# Patient Record
Sex: MALE | Race: BLACK OR AFRICAN AMERICAN | Employment: UNEMPLOYED
[De-identification: names, ages, dates, MRNs, and addresses within clinical notes are randomized per-mention and may not be internally consistent; named-entity substitution may affect disease eponyms.]

---

## 2022-03-18 PROBLEM — K92.2 GI BLEED: Status: ACTIVE | Noted: 2022-02-03

## 2022-03-18 PROBLEM — I63.9 STROKE (HCC): Status: ACTIVE | Noted: 2022-01-02

## 2022-03-18 PROBLEM — I63.9 ACUTE CVA (CEREBROVASCULAR ACCIDENT) (HCC): Status: ACTIVE | Noted: 2022-01-02

## 2022-03-19 PROBLEM — K52.9 SIGMOIDITIS: Status: ACTIVE | Noted: 2022-02-03

## 2022-03-19 PROBLEM — R29.898 LOWER EXTREMITY WEAKNESS: Status: ACTIVE | Noted: 2022-01-02

## 2022-05-19 PROBLEM — R55 SYNCOPE: Status: ACTIVE | Noted: 2022-05-19

## 2022-06-22 PROBLEM — I63.9 CVA (CEREBRAL VASCULAR ACCIDENT) (HCC): Status: ACTIVE | Noted: 2022-06-22

## 2022-06-23 PROBLEM — R50.9 FEVER: Status: ACTIVE | Noted: 2022-06-23

## 2022-06-23 PROBLEM — R29.810 FACIAL DROOP: Status: ACTIVE | Noted: 2022-06-23

## 2023-02-01 RX ORDER — CLOPIDOGREL BISULFATE 75 MG/1
75 TABLET ORAL DAILY
Status: ON HOLD | COMMUNITY
Start: 2022-01-11 | End: 2023-04-03 | Stop reason: HOSPADM

## 2023-02-01 RX ORDER — ATORVASTATIN CALCIUM 80 MG/1
80 TABLET, FILM COATED ORAL
COMMUNITY
Start: 2022-01-10

## 2023-02-01 RX ORDER — PANTOPRAZOLE SODIUM 40 MG/1
40 TABLET, DELAYED RELEASE ORAL
COMMUNITY
Start: 2022-02-09 | End: 2023-04-01 | Stop reason: CLARIF

## 2023-02-01 RX ORDER — ASPIRIN 81 MG/1
81 TABLET, CHEWABLE ORAL DAILY
COMMUNITY
Start: 2022-01-11

## 2023-02-01 RX ORDER — AMLODIPINE BESYLATE 10 MG/1
10 TABLET ORAL DAILY
Status: ON HOLD | COMMUNITY
Start: 2022-01-11 | End: 2023-04-03 | Stop reason: HOSPADM

## 2023-04-01 ENCOUNTER — APPOINTMENT (OUTPATIENT)
Facility: HOSPITAL | Age: 68
DRG: 379 | End: 2023-04-01
Payer: MEDICARE

## 2023-04-01 ENCOUNTER — HOSPITAL ENCOUNTER (INPATIENT)
Facility: HOSPITAL | Age: 68
LOS: 2 days | Discharge: NURSING, REHAB OR LONG-TERM CARE | DRG: 379 | End: 2023-04-03
Attending: EMERGENCY MEDICINE | Admitting: INTERNAL MEDICINE
Payer: MEDICARE

## 2023-04-01 DIAGNOSIS — R55 NEAR SYNCOPE: ICD-10-CM

## 2023-04-01 DIAGNOSIS — R55 SYNCOPE AND COLLAPSE: ICD-10-CM

## 2023-04-01 DIAGNOSIS — D50.0 BLOOD LOSS ANEMIA: Primary | ICD-10-CM

## 2023-04-01 DIAGNOSIS — K92.2 GASTROINTESTINAL HEMORRHAGE, UNSPECIFIED GASTROINTESTINAL HEMORRHAGE TYPE: ICD-10-CM

## 2023-04-01 PROBLEM — I10 ESSENTIAL HYPERTENSION, BENIGN: Status: ACTIVE | Noted: 2022-04-25

## 2023-04-01 PROBLEM — Z86.73 HISTORY OF CVA (CEREBROVASCULAR ACCIDENT): Status: ACTIVE | Noted: 2022-01-02

## 2023-04-01 PROBLEM — D64.9 SYMPTOMATIC ANEMIA: Status: ACTIVE | Noted: 2023-04-01

## 2023-04-01 LAB
ALBUMIN SERPL-MCNC: 3.2 G/DL (ref 3.4–5)
ALBUMIN/GLOB SERPL: 0.9 (ref 0.8–1.7)
ALP SERPL-CCNC: 85 U/L (ref 45–117)
ALT SERPL-CCNC: 22 U/L (ref 16–61)
ANION GAP SERPL CALC-SCNC: 7 MMOL/L (ref 3–18)
APPEARANCE UR: CLEAR
AST SERPL-CCNC: 17 U/L (ref 10–38)
BACTERIA URNS QL MICRO: NEGATIVE /HPF
BASOPHILS # BLD: 0.1 K/UL (ref 0–0.1)
BASOPHILS # BLD: 0.1 K/UL (ref 0–0.1)
BASOPHILS NFR BLD: 1 % (ref 0–2)
BASOPHILS NFR BLD: 1 % (ref 0–2)
BILIRUB SERPL-MCNC: 0.2 MG/DL (ref 0.2–1)
BILIRUB UR QL: NEGATIVE
BUN SERPL-MCNC: 12 MG/DL (ref 7–18)
BUN/CREAT SERPL: 10 (ref 12–20)
CALCIUM SERPL-MCNC: 8.8 MG/DL (ref 8.5–10.1)
CHLORIDE SERPL-SCNC: 107 MMOL/L (ref 100–111)
CO2 SERPL-SCNC: 27 MMOL/L (ref 21–32)
COLOR UR: ABNORMAL
CREAT SERPL-MCNC: 1.25 MG/DL (ref 0.6–1.3)
DIFFERENTIAL METHOD BLD: ABNORMAL
DIFFERENTIAL METHOD BLD: ABNORMAL
EKG ATRIAL RATE: 66 BPM
EKG DIAGNOSIS: NORMAL
EKG P AXIS: 69 DEGREES
EKG P-R INTERVAL: 146 MS
EKG Q-T INTERVAL: 434 MS
EKG QRS DURATION: 76 MS
EKG QTC CALCULATION (BAZETT): 454 MS
EKG R AXIS: 51 DEGREES
EKG T AXIS: 54 DEGREES
EKG VENTRICULAR RATE: 66 BPM
EOSINOPHIL # BLD: 0 K/UL (ref 0–0.4)
EOSINOPHIL # BLD: 0.1 K/UL (ref 0–0.4)
EOSINOPHIL NFR BLD: 0 % (ref 0–5)
EOSINOPHIL NFR BLD: 1 % (ref 0–5)
EPITH CASTS URNS QL MICRO: NORMAL /LPF (ref 0–5)
ERYTHROCYTE [DISTWIDTH] IN BLOOD BY AUTOMATED COUNT: 15.2 % (ref 11.6–14.5)
ERYTHROCYTE [DISTWIDTH] IN BLOOD BY AUTOMATED COUNT: 15.2 % (ref 11.6–14.5)
FERRITIN SERPL-MCNC: 26 NG/ML (ref 8–388)
FOLATE SERPL-MCNC: 19.9 NG/ML (ref 3.1–17.5)
GLOBULIN SER CALC-MCNC: 3.6 G/DL (ref 2–4)
GLUCOSE SERPL-MCNC: 153 MG/DL (ref 74–99)
GLUCOSE UR STRIP.AUTO-MCNC: NEGATIVE MG/DL
GRAN CASTS URNS QL MICRO: NORMAL /LPF
HCT VFR BLD AUTO: 25.4 % (ref 36–48)
HCT VFR BLD AUTO: 25.4 % (ref 36–48)
HEMOCCULT STL QL: POSITIVE
HGB BLD-MCNC: 7.7 G/DL (ref 13–16)
HGB BLD-MCNC: 7.7 G/DL (ref 13–16)
HGB UR QL STRIP: NEGATIVE
IMM GRANULOCYTES # BLD AUTO: 0 K/UL (ref 0–0.04)
IMM GRANULOCYTES # BLD AUTO: 0 K/UL (ref 0–0.04)
IMM GRANULOCYTES NFR BLD AUTO: 0 % (ref 0–0.5)
IMM GRANULOCYTES NFR BLD AUTO: 0 % (ref 0–0.5)
INR PPP: 1 (ref 0.8–1.2)
IRON SATN MFR SERPL: 12 % (ref 20–50)
IRON SERPL-MCNC: 34 UG/DL (ref 50–175)
KETONES UR QL STRIP.AUTO: ABNORMAL MG/DL
LEUKOCYTE ESTERASE UR QL STRIP.AUTO: NEGATIVE
LYMPHOCYTES # BLD: 1.3 K/UL (ref 0.9–3.6)
LYMPHOCYTES # BLD: 1.5 K/UL (ref 0.9–3.6)
LYMPHOCYTES NFR BLD: 13 % (ref 21–52)
LYMPHOCYTES NFR BLD: 14 % (ref 21–52)
MAGNESIUM SERPL-MCNC: 2.2 MG/DL (ref 1.6–2.6)
MCH RBC QN AUTO: 26.9 PG (ref 24–34)
MCH RBC QN AUTO: 27.2 PG (ref 24–34)
MCHC RBC AUTO-ENTMCNC: 30.3 G/DL (ref 31–37)
MCHC RBC AUTO-ENTMCNC: 30.3 G/DL (ref 31–37)
MCV RBC AUTO: 88.8 FL (ref 78–100)
MCV RBC AUTO: 89.8 FL (ref 78–100)
MONOCYTES # BLD: 0.6 K/UL (ref 0.05–1.2)
MONOCYTES # BLD: 0.6 K/UL (ref 0.05–1.2)
MONOCYTES NFR BLD: 5 % (ref 3–10)
MONOCYTES NFR BLD: 6 % (ref 3–10)
NEUTS SEG # BLD: 7.3 K/UL (ref 1.8–8)
NEUTS SEG # BLD: 8.9 K/UL (ref 1.8–8)
NEUTS SEG NFR BLD: 78 % (ref 40–73)
NEUTS SEG NFR BLD: 80 % (ref 40–73)
NITRITE UR QL STRIP.AUTO: NEGATIVE
NRBC # BLD: 0 K/UL (ref 0–0.01)
NRBC # BLD: 0.02 K/UL (ref 0–0.01)
NRBC BLD-RTO: 0 PER 100 WBC
NRBC BLD-RTO: 0.2 PER 100 WBC
PH UR STRIP: 6 (ref 5–8)
PLATELET # BLD AUTO: 295 K/UL (ref 135–420)
PLATELET # BLD AUTO: 296 K/UL (ref 135–420)
PMV BLD AUTO: 11.2 FL (ref 9.2–11.8)
PMV BLD AUTO: 11.6 FL (ref 9.2–11.8)
POTASSIUM SERPL-SCNC: 3.9 MMOL/L (ref 3.5–5.5)
PROT SERPL-MCNC: 6.8 G/DL (ref 6.4–8.2)
PROT UR STRIP-MCNC: 30 MG/DL
PROTHROMBIN TIME: 13.6 SEC (ref 11.5–15.2)
RBC # BLD AUTO: 2.83 M/UL (ref 4.35–5.65)
RBC # BLD AUTO: 2.86 M/UL (ref 4.35–5.65)
RBC #/AREA URNS HPF: NEGATIVE /HPF (ref 0–5)
SODIUM SERPL-SCNC: 141 MMOL/L (ref 136–145)
SP GR UR REFRACTOMETRY: 1.02 (ref 1–1.03)
TIBC SERPL-MCNC: 276 UG/DL (ref 250–450)
TROPONIN I SERPL HS-MCNC: 6 NG/L (ref 0–78)
UROBILINOGEN UR QL STRIP.AUTO: 1 EU/DL (ref 0.2–1)
VIT B12 SERPL-MCNC: 553 PG/ML (ref 211–911)
WBC # BLD AUTO: 11 K/UL (ref 4.6–13.2)
WBC # BLD AUTO: 9.4 K/UL (ref 4.6–13.2)
WBC URNS QL MICRO: NORMAL /HPF (ref 0–4)

## 2023-04-01 PROCEDURE — 93005 ELECTROCARDIOGRAM TRACING: CPT | Performed by: STUDENT IN AN ORGANIZED HEALTH CARE EDUCATION/TRAINING PROGRAM

## 2023-04-01 PROCEDURE — C9113 INJ PANTOPRAZOLE SODIUM, VIA: HCPCS | Performed by: STUDENT IN AN ORGANIZED HEALTH CARE EDUCATION/TRAINING PROGRAM

## 2023-04-01 PROCEDURE — A4216 STERILE WATER/SALINE, 10 ML: HCPCS | Performed by: STUDENT IN AN ORGANIZED HEALTH CARE EDUCATION/TRAINING PROGRAM

## 2023-04-01 PROCEDURE — 6370000000 HC RX 637 (ALT 250 FOR IP): Performed by: PHYSICIAN ASSISTANT

## 2023-04-01 PROCEDURE — 83735 ASSAY OF MAGNESIUM: CPT

## 2023-04-01 PROCEDURE — 2580000003 HC RX 258: Performed by: PHYSICIAN ASSISTANT

## 2023-04-01 PROCEDURE — 85610 PROTHROMBIN TIME: CPT

## 2023-04-01 PROCEDURE — 85025 COMPLETE CBC W/AUTO DIFF WBC: CPT

## 2023-04-01 PROCEDURE — 71045 X-RAY EXAM CHEST 1 VIEW: CPT

## 2023-04-01 PROCEDURE — 70450 CT HEAD/BRAIN W/O DYE: CPT

## 2023-04-01 PROCEDURE — 81001 URINALYSIS AUTO W/SCOPE: CPT

## 2023-04-01 PROCEDURE — 99285 EMERGENCY DEPT VISIT HI MDM: CPT

## 2023-04-01 PROCEDURE — 82272 OCCULT BLD FECES 1-3 TESTS: CPT

## 2023-04-01 PROCEDURE — 84484 ASSAY OF TROPONIN QUANT: CPT

## 2023-04-01 PROCEDURE — 99223 1ST HOSP IP/OBS HIGH 75: CPT | Performed by: PHYSICIAN ASSISTANT

## 2023-04-01 PROCEDURE — 82746 ASSAY OF FOLIC ACID SERUM: CPT

## 2023-04-01 PROCEDURE — 82728 ASSAY OF FERRITIN: CPT

## 2023-04-01 PROCEDURE — 2580000003 HC RX 258: Performed by: STUDENT IN AN ORGANIZED HEALTH CARE EDUCATION/TRAINING PROGRAM

## 2023-04-01 PROCEDURE — 94761 N-INVAS EAR/PLS OXIMETRY MLT: CPT

## 2023-04-01 PROCEDURE — 6360000002 HC RX W HCPCS: Performed by: PHYSICIAN ASSISTANT

## 2023-04-01 PROCEDURE — 93010 ELECTROCARDIOGRAM REPORT: CPT | Performed by: INTERNAL MEDICINE

## 2023-04-01 PROCEDURE — 96374 THER/PROPH/DIAG INJ IV PUSH: CPT

## 2023-04-01 PROCEDURE — 83540 ASSAY OF IRON: CPT

## 2023-04-01 PROCEDURE — A4216 STERILE WATER/SALINE, 10 ML: HCPCS | Performed by: PHYSICIAN ASSISTANT

## 2023-04-01 PROCEDURE — 87086 URINE CULTURE/COLONY COUNT: CPT

## 2023-04-01 PROCEDURE — 6360000002 HC RX W HCPCS: Performed by: STUDENT IN AN ORGANIZED HEALTH CARE EDUCATION/TRAINING PROGRAM

## 2023-04-01 PROCEDURE — 73030 X-RAY EXAM OF SHOULDER: CPT

## 2023-04-01 PROCEDURE — 80053 COMPREHEN METABOLIC PANEL: CPT

## 2023-04-01 PROCEDURE — C9113 INJ PANTOPRAZOLE SODIUM, VIA: HCPCS | Performed by: PHYSICIAN ASSISTANT

## 2023-04-01 PROCEDURE — 1100000003 HC PRIVATE W/ TELEMETRY

## 2023-04-01 RX ORDER — POLYETHYLENE GLYCOL 3350 17 G/17G
17 POWDER, FOR SOLUTION ORAL DAILY PRN
Status: DISCONTINUED | OUTPATIENT
Start: 2023-04-01 | End: 2023-04-03 | Stop reason: HOSPADM

## 2023-04-01 RX ORDER — ACETAMINOPHEN 325 MG/1
650 TABLET ORAL EVERY 6 HOURS PRN
Status: DISCONTINUED | OUTPATIENT
Start: 2023-04-01 | End: 2023-04-03 | Stop reason: HOSPADM

## 2023-04-01 RX ORDER — SODIUM CHLORIDE 0.9 % (FLUSH) 0.9 %
5-40 SYRINGE (ML) INJECTION EVERY 12 HOURS SCHEDULED
Status: DISCONTINUED | OUTPATIENT
Start: 2023-04-01 | End: 2023-04-03 | Stop reason: HOSPADM

## 2023-04-01 RX ORDER — ONDANSETRON 4 MG/1
4 TABLET, ORALLY DISINTEGRATING ORAL EVERY 8 HOURS PRN
Status: DISCONTINUED | OUTPATIENT
Start: 2023-04-01 | End: 2023-04-03 | Stop reason: HOSPADM

## 2023-04-01 RX ORDER — PANTOPRAZOLE SODIUM 20 MG/1
20 TABLET, DELAYED RELEASE ORAL DAILY
Status: ON HOLD | COMMUNITY
Start: 2023-03-22 | End: 2023-04-03 | Stop reason: HOSPADM

## 2023-04-01 RX ORDER — ACETAMINOPHEN 650 MG/1
650 SUPPOSITORY RECTAL EVERY 6 HOURS PRN
Status: DISCONTINUED | OUTPATIENT
Start: 2023-04-01 | End: 2023-04-03 | Stop reason: HOSPADM

## 2023-04-01 RX ORDER — ONDANSETRON 2 MG/ML
4 INJECTION INTRAMUSCULAR; INTRAVENOUS EVERY 6 HOURS PRN
Status: DISCONTINUED | OUTPATIENT
Start: 2023-04-01 | End: 2023-04-03 | Stop reason: HOSPADM

## 2023-04-01 RX ORDER — BISACODYL 10 MG
10 SUPPOSITORY, RECTAL RECTAL DAILY PRN
Status: DISCONTINUED | OUTPATIENT
Start: 2023-04-01 | End: 2023-04-03 | Stop reason: HOSPADM

## 2023-04-01 RX ORDER — SODIUM CHLORIDE 9 MG/ML
INJECTION, SOLUTION INTRAVENOUS PRN
Status: DISCONTINUED | OUTPATIENT
Start: 2023-04-01 | End: 2023-04-03 | Stop reason: HOSPADM

## 2023-04-01 RX ORDER — ATORVASTATIN CALCIUM 40 MG/1
80 TABLET, FILM COATED ORAL NIGHTLY
Status: DISCONTINUED | OUTPATIENT
Start: 2023-04-01 | End: 2023-04-03 | Stop reason: HOSPADM

## 2023-04-01 RX ORDER — SODIUM CHLORIDE 0.9 % (FLUSH) 0.9 %
5-40 SYRINGE (ML) INJECTION PRN
Status: DISCONTINUED | OUTPATIENT
Start: 2023-04-01 | End: 2023-04-03 | Stop reason: HOSPADM

## 2023-04-01 RX ADMIN — SODIUM CHLORIDE, PRESERVATIVE FREE 10 ML: 5 INJECTION INTRAVENOUS at 21:35

## 2023-04-01 RX ADMIN — SODIUM CHLORIDE 40 MG: 9 INJECTION INTRAMUSCULAR; INTRAVENOUS; SUBCUTANEOUS at 15:13

## 2023-04-01 RX ADMIN — ATORVASTATIN CALCIUM 80 MG: 40 TABLET, FILM COATED ORAL at 21:35

## 2023-04-01 RX ADMIN — SODIUM CHLORIDE 40 MG: 9 INJECTION INTRAMUSCULAR; INTRAVENOUS; SUBCUTANEOUS at 21:35

## 2023-04-01 ASSESSMENT — PAIN - FUNCTIONAL ASSESSMENT: PAIN_FUNCTIONAL_ASSESSMENT: NONE - DENIES PAIN

## 2023-04-01 ASSESSMENT — ENCOUNTER SYMPTOMS
CHEST TIGHTNESS: 0
TROUBLE SWALLOWING: 0
DIARRHEA: 0
COUGH: 0
SINUS PRESSURE: 0
ABDOMINAL PAIN: 0
PHOTOPHOBIA: 0
NAUSEA: 0
SHORTNESS OF BREATH: 0
VOMITING: 0
SORE THROAT: 0

## 2023-04-01 NOTE — CONSULTS
limits for recent and remote events. mood and affect: no evidence of depression, anxiety or agitation. orientation: oriented to time, space and person. Basic Metabolic Profile   Recent Labs     04/01/23  1230      K 3.9      CO2 27   BUN 12   MG 2.2         CBC w/Diff    Recent Labs     04/01/23  1745   WBC 11.0   RBC 2.86*   HGB 7.7*   HCT 25.4*   MCV 88.8   MCH 26.9   MCHC 30.3*   RDW 15.2*      MPV 11.6    No results for input(s): MONO, BASO, PRO, METAS, BLAST in the last 72 hours. Invalid input(s): GRANS, LYMPH, EOS, MYELO     Hepatic Function   No results for input(s): ALB, TP, AML in the last 72 hours.     Invalid input(s): DBILI, TBILI, GPT, SGOT, AP, LPSE     Coags   Recent Labs     04/01/23  1230   INR 1.0           Breanna Smith MD.   [facility]  [phone]

## 2023-04-01 NOTE — ED NOTES
Pt requesting something to eat, per MD Pt can eat at this time.      Jac Howard, TRICIA  04/01/23 7229

## 2023-04-01 NOTE — ED NOTES
Report called to [facility] for Pt. All questions answered.       Lizz Salinas RN  04/01/23 5181

## 2023-04-01 NOTE — ED TRIAGE NOTES
Pt arrived via EMS from [facility] presenting to ER for dizziness s/p fall, per report pt was walking towards his son's car when he suddenly felt weak, dizzy and fell down on the ground, denies hitting head, denies LOC. Hx CVA with right side deficit 2021 pt taking ASA and Plavix, on Ed arrival pt is alert and oriented x4, denies feeling dizzy at this time.

## 2023-04-02 ENCOUNTER — ANESTHESIA EVENT (OUTPATIENT)
Facility: HOSPITAL | Age: 68
End: 2023-04-02
Payer: MEDICARE

## 2023-04-02 ENCOUNTER — ANESTHESIA (OUTPATIENT)
Facility: HOSPITAL | Age: 68
End: 2023-04-02
Payer: MEDICARE

## 2023-04-02 ENCOUNTER — APPOINTMENT (OUTPATIENT)
Facility: HOSPITAL | Age: 68
DRG: 379 | End: 2023-04-02
Payer: MEDICARE

## 2023-04-02 LAB
ANION GAP SERPL CALC-SCNC: 6 MMOL/L (ref 3–18)
BACTERIA SPEC CULT: NORMAL
BASOPHILS # BLD: 0 K/UL (ref 0–0.1)
BASOPHILS NFR BLD: 1 % (ref 0–2)
BUN SERPL-MCNC: 14 MG/DL (ref 7–18)
BUN/CREAT SERPL: 12 (ref 12–20)
CALCIUM SERPL-MCNC: 8.8 MG/DL (ref 8.5–10.1)
CC UR VC: NORMAL
CHLORIDE SERPL-SCNC: 110 MMOL/L (ref 100–111)
CO2 SERPL-SCNC: 26 MMOL/L (ref 21–32)
CREAT SERPL-MCNC: 1.14 MG/DL (ref 0.6–1.3)
DIFFERENTIAL METHOD BLD: ABNORMAL
EOSINOPHIL # BLD: 0.1 K/UL (ref 0–0.4)
EOSINOPHIL NFR BLD: 1 % (ref 0–5)
ERYTHROCYTE [DISTWIDTH] IN BLOOD BY AUTOMATED COUNT: 15.5 % (ref 11.6–14.5)
GLUCOSE BLD STRIP.AUTO-MCNC: 146 MG/DL (ref 70–110)
GLUCOSE SERPL-MCNC: 125 MG/DL (ref 74–99)
HCT VFR BLD AUTO: 23 % (ref 36–48)
HCT VFR BLD AUTO: 23.7 % (ref 36–48)
HCT VFR BLD AUTO: 23.8 % (ref 36–48)
HGB BLD-MCNC: 6.9 G/DL (ref 13–16)
HGB BLD-MCNC: 7 G/DL (ref 13–16)
HGB BLD-MCNC: 7.1 G/DL (ref 13–16)
IMM GRANULOCYTES # BLD AUTO: 0 K/UL (ref 0–0.04)
IMM GRANULOCYTES NFR BLD AUTO: 0 % (ref 0–0.5)
LYMPHOCYTES # BLD: 1.9 K/UL (ref 0.9–3.6)
LYMPHOCYTES NFR BLD: 22 % (ref 21–52)
MCH RBC QN AUTO: 27.1 PG (ref 24–34)
MCHC RBC AUTO-ENTMCNC: 30 G/DL (ref 31–37)
MCV RBC AUTO: 90.2 FL (ref 78–100)
MONOCYTES # BLD: 0.8 K/UL (ref 0.05–1.2)
MONOCYTES NFR BLD: 9 % (ref 3–10)
NEUTS SEG # BLD: 5.9 K/UL (ref 1.8–8)
NEUTS SEG NFR BLD: 68 % (ref 40–73)
NRBC # BLD: 0 K/UL (ref 0–0.01)
NRBC BLD-RTO: 0 PER 100 WBC
PLATELET # BLD AUTO: 264 K/UL (ref 135–420)
PMV BLD AUTO: 11.6 FL (ref 9.2–11.8)
POTASSIUM SERPL-SCNC: 3.8 MMOL/L (ref 3.5–5.5)
RBC # BLD AUTO: 2.55 M/UL (ref 4.35–5.65)
RETICS/RBC NFR AUTO: 5.4 % (ref 0.5–2.5)
SERVICE CMNT-IMP: NORMAL
SODIUM SERPL-SCNC: 142 MMOL/L (ref 136–145)
WBC # BLD AUTO: 8.7 K/UL (ref 4.6–13.2)

## 2023-04-02 PROCEDURE — 7100000001 HC PACU RECOVERY - ADDTL 15 MIN: Performed by: INTERNAL MEDICINE

## 2023-04-02 PROCEDURE — 2580000003 HC RX 258: Performed by: PHYSICIAN ASSISTANT

## 2023-04-02 PROCEDURE — 80048 BASIC METABOLIC PNL TOTAL CA: CPT

## 2023-04-02 PROCEDURE — 97530 THERAPEUTIC ACTIVITIES: CPT

## 2023-04-02 PROCEDURE — 2500000003 HC RX 250 WO HCPCS: Performed by: NURSE ANESTHETIST, CERTIFIED REGISTERED

## 2023-04-02 PROCEDURE — 97162 PT EVAL MOD COMPLEX 30 MIN: CPT

## 2023-04-02 PROCEDURE — 2709999900 HC NON-CHARGEABLE SUPPLY: Performed by: INTERNAL MEDICINE

## 2023-04-02 PROCEDURE — 6370000000 HC RX 637 (ALT 250 FOR IP): Performed by: PHYSICIAN ASSISTANT

## 2023-04-02 PROCEDURE — 82962 GLUCOSE BLOOD TEST: CPT

## 2023-04-02 PROCEDURE — 6360000002 HC RX W HCPCS: Performed by: NURSE ANESTHETIST, CERTIFIED REGISTERED

## 2023-04-02 PROCEDURE — 0DJ08ZZ INSPECTION OF UPPER INTESTINAL TRACT, VIA NATURAL OR ARTIFICIAL OPENING ENDOSCOPIC: ICD-10-PCS | Performed by: INTERNAL MEDICINE

## 2023-04-02 PROCEDURE — 36415 COLL VENOUS BLD VENIPUNCTURE: CPT

## 2023-04-02 PROCEDURE — 99232 SBSQ HOSP IP/OBS MODERATE 35: CPT | Performed by: INTERNAL MEDICINE

## 2023-04-02 PROCEDURE — 6370000000 HC RX 637 (ALT 250 FOR IP): Performed by: INTERNAL MEDICINE

## 2023-04-02 PROCEDURE — 3700000000 HC ANESTHESIA ATTENDED CARE: Performed by: INTERNAL MEDICINE

## 2023-04-02 PROCEDURE — 85018 HEMOGLOBIN: CPT

## 2023-04-02 PROCEDURE — 85025 COMPLETE CBC W/AUTO DIFF WBC: CPT

## 2023-04-02 PROCEDURE — 3600007502: Performed by: INTERNAL MEDICINE

## 2023-04-02 PROCEDURE — 85045 AUTOMATED RETICULOCYTE COUNT: CPT

## 2023-04-02 PROCEDURE — 7100000000 HC PACU RECOVERY - FIRST 15 MIN: Performed by: INTERNAL MEDICINE

## 2023-04-02 PROCEDURE — 97116 GAIT TRAINING THERAPY: CPT

## 2023-04-02 PROCEDURE — 1100000003 HC PRIVATE W/ TELEMETRY

## 2023-04-02 PROCEDURE — 97165 OT EVAL LOW COMPLEX 30 MIN: CPT

## 2023-04-02 RX ORDER — FENTANYL CITRATE 50 UG/ML
25 INJECTION, SOLUTION INTRAMUSCULAR; INTRAVENOUS EVERY 5 MIN PRN
Status: DISCONTINUED | OUTPATIENT
Start: 2023-04-02 | End: 2023-04-02 | Stop reason: HOSPADM

## 2023-04-02 RX ORDER — ONDANSETRON 2 MG/ML
4 INJECTION INTRAMUSCULAR; INTRAVENOUS
Status: DISCONTINUED | OUTPATIENT
Start: 2023-04-02 | End: 2023-04-02 | Stop reason: HOSPADM

## 2023-04-02 RX ORDER — SODIUM CHLORIDE 9 MG/ML
INJECTION, SOLUTION INTRAVENOUS PRN
Status: DISCONTINUED | OUTPATIENT
Start: 2023-04-02 | End: 2023-04-02 | Stop reason: HOSPADM

## 2023-04-02 RX ORDER — PANTOPRAZOLE SODIUM 40 MG/1
40 TABLET, DELAYED RELEASE ORAL
Status: DISCONTINUED | OUTPATIENT
Start: 2023-04-02 | End: 2023-04-03 | Stop reason: HOSPADM

## 2023-04-02 RX ORDER — SODIUM CHLORIDE 0.9 % (FLUSH) 0.9 %
5-40 SYRINGE (ML) INJECTION PRN
Status: DISCONTINUED | OUTPATIENT
Start: 2023-04-02 | End: 2023-04-02 | Stop reason: HOSPADM

## 2023-04-02 RX ORDER — LIDOCAINE HYDROCHLORIDE 20 MG/ML
INJECTION, SOLUTION EPIDURAL; INFILTRATION; INTRACAUDAL; PERINEURAL PRN
Status: DISCONTINUED | OUTPATIENT
Start: 2023-04-02 | End: 2023-04-02 | Stop reason: SDUPTHER

## 2023-04-02 RX ORDER — SODIUM CHLORIDE 0.9 % (FLUSH) 0.9 %
5-40 SYRINGE (ML) INJECTION EVERY 12 HOURS SCHEDULED
Status: DISCONTINUED | OUTPATIENT
Start: 2023-04-02 | End: 2023-04-02 | Stop reason: HOSPADM

## 2023-04-02 RX ORDER — DIPHENHYDRAMINE HYDROCHLORIDE 50 MG/ML
12.5 INJECTION INTRAMUSCULAR; INTRAVENOUS
Status: DISCONTINUED | OUTPATIENT
Start: 2023-04-02 | End: 2023-04-02 | Stop reason: HOSPADM

## 2023-04-02 RX ORDER — PROPOFOL 10 MG/ML
INJECTION, EMULSION INTRAVENOUS PRN
Status: DISCONTINUED | OUTPATIENT
Start: 2023-04-02 | End: 2023-04-02 | Stop reason: SDUPTHER

## 2023-04-02 RX ADMIN — PANTOPRAZOLE SODIUM 40 MG: 40 TABLET, DELAYED RELEASE ORAL at 10:29

## 2023-04-02 RX ADMIN — PROPOFOL 100 MG: 10 INJECTION, EMULSION INTRAVENOUS at 08:04

## 2023-04-02 RX ADMIN — ATORVASTATIN CALCIUM 80 MG: 40 TABLET, FILM COATED ORAL at 21:17

## 2023-04-02 RX ADMIN — PROPOFOL 25 MG: 10 INJECTION, EMULSION INTRAVENOUS at 08:06

## 2023-04-02 RX ADMIN — SODIUM CHLORIDE, PRESERVATIVE FREE 10 ML: 5 INJECTION INTRAVENOUS at 21:17

## 2023-04-02 RX ADMIN — LIDOCAINE HYDROCHLORIDE 20 MG: 20 INJECTION, SOLUTION EPIDURAL; INFILTRATION; INTRACAUDAL; PERINEURAL at 08:03

## 2023-04-02 RX ADMIN — SODIUM CHLORIDE, PRESERVATIVE FREE 10 ML: 5 INJECTION INTRAVENOUS at 10:29

## 2023-04-02 ASSESSMENT — PAIN - FUNCTIONAL ASSESSMENT: PAIN_FUNCTIONAL_ASSESSMENT: 0-10

## 2023-04-02 NOTE — ANESTHESIA POSTPROCEDURE EVALUATION
Department of Anesthesiology  Postprocedure Note    Patient: Babar Rueda  MRN: [id number]  [place]: 1955  Date of evaluation: 4/2/2023      Procedure Summary     Date: 04/02/23 Room / Location: [facility] ENDO 01 / [facility] ENDOSCOPY    Anesthesia Start: 0800 Anesthesia Stop: 0809    Procedure: EGD ESOPHAGOGASTRODUODENOSCOPY (Upper GI Region) Diagnosis:       Gastrointestinal hemorrhage, unspecified gastrointestinal hemorrhage type      (Gastrointestinal hemorrhage, unspecified gastrointestinal hemorrhage type [K92.2])    Surgeons: Dion Roger MD Responsible Provider: Christen Field MD    Anesthesia Type: MAC ASA Status: 3          Anesthesia Type: MAC    Wes Phase I: Wes Score: 9    Wes Phase II:        Anesthesia Post Evaluation    Patient location during evaluation: bedside  Patient participation: complete - patient participated  Airway patency: patent  Complications: no  Cardiovascular status: hemodynamically stable  Respiratory status: acceptable  Hydration status: stable

## 2023-04-02 NOTE — H&P
GASTROENTEROLOGY Pre-Procedure H and P      Impression/Plan:   1.  This patient is consented for an EGD for  Anemia, concern for possible upper gi bleeding    Addendum: All lab tests orders pertaining to the procedure have been ordered by the anesthesia personnel and results will be addressed by the anesthesia team    Chief Complaint: Anemia, concern for possible upper gi bleeding    HPI:  Sandra Rae is a 77 y.o. male who is having an EGD for Anemia, concern for possible upper gi bleeding    PMH:   Past Medical History:   Diagnosis Date    Diabetes (R[facility].)     Gastrointestinal disorder     Hyperlipidemia     Hypertension     Stroke ([facility]        PSH:   Past Surgical History:   Procedure Laterality Date    COLONOSCOPY N/A 6/6/2022    DIAGNOSTIC COLONOSCOPY performed by Sherice Manzano MD at [place] [address]  2/7/2022            Social HX:   Social History     Socioeconomic History    Marital status: Single     Spouse name: Not on file    Number of children: Not on file    Years of education: Not on file    Highest education level: Not on file   Occupational History    Not on file   Tobacco Use    Smoking status: Never    Smokeless tobacco: Never   Substance and Sexual Activity    Alcohol use: Not Currently    Drug use: Not Currently    Sexual activity: Not on file   Other Topics Concern    Not on file   Social History Narrative    Not on file     Social Determinants of Health     Financial Resource Strain: Not on file   Food Insecurity: Not on file   Transportation Needs: Not on file   Physical Activity: Not on file   Stress: Not on file   Social Connections: Not on file   Intimate Partner Violence: Not on file   Housing Stability: Not on file       FHX:   Family History   Problem Relation Age of Onset    Hypertension Sister     Elevated Lipids Sister     Seizures Brother     Stroke Brother        Allergy:   No Known Allergies    Home Medications:
performing physical exam, ordering tests and medications, documenting in the chart, and coordinating overall care is 75 minutes    Chrissie Putnam PA-C  [address]  Office:  [phone]  Pager: [phone]

## 2023-04-02 NOTE — ANESTHESIA PRE PROCEDURE
Department of Anesthesiology  Preprocedure Note       Name:  Juan A Sin   Age:  77 y.o.  :  1955                                          MRN:  [id number]         Date:  2023      Surgeon: Jessie Mohs):  Sarita Robles MD    Procedure: Procedure(s):  EGD ESOPHAGOGASTRODUODENOSCOPY    Medications prior to admission:   Prior to Admission medications    Medication Sig Start Date End Date Taking? Authorizing Provider   diclofenac sodium (VOLTAREN) 1 % GEL  23   Historical Provider, MD   pantoprazole (PROTONIX) 20 MG tablet Take 20 mg by mouth daily 3/22/23   Historical Provider, MD   amLODIPine (NORVASC) 10 MG tablet Take 10 mg by mouth daily 22   Ar Automatic Reconciliation   aspirin 81 MG chewable tablet Take 81 mg by mouth daily 22   Ar Automatic Reconciliation   atorvastatin (LIPITOR) 80 MG tablet Take 80 mg by mouth  Patient not taking: Reported on 2023 1/10/22   Ar Automatic Reconciliation   Cholecalciferol 50 MCG ( UT) TABS Take by mouth  Patient not taking: Reported on 2023    Ar Automatic Reconciliation   clopidogrel (PLAVIX) 75 MG tablet Take 75 mg by mouth daily 22   Ar Automatic Reconciliation   metoprolol tartrate (LOPRESSOR) 25 MG tablet Take 12.5 mg by mouth in the morning and 12.5 mg in the evening.  1/10/22   Ar Automatic Reconciliation       Current medications:    Current Facility-Administered Medications   Medication Dose Route Frequency Provider Last Rate Last Admin    pantoprazole (PROTONIX) 40 mg in sodium chloride (PF) 0.9 % 10 mL injection  40 mg IntraVENous BID Shekhar Friendly Reprogle, PA   40 mg at 23 2135    sodium chloride flush 0.9 % injection 5-40 mL  5-40 mL IntraVENous 2 times per day Shekhar Friendly Reprogle, PA   10 mL at 23 2135    sodium chloride flush 0.9 % injection 5-40 mL  5-40 mL IntraVENous PRN Shekhar Friendly Reprogle, PA        0.9 % sodium chloride infusion   IntraVENous PRN Shekhar Friendly Reprogle, PA        ondansetron (ZOFRAN-ODT)

## 2023-04-02 NOTE — OP NOTE
February 2022 at [facility] was normal and a colonoscopy in June 2022-that also did not show any bleeding sources/source for anemia. It is possible that anemia could be related to non-GI causes; other possibility is low-grade bleeding from the small bowel-recommend outpatient evaluation with pill camera. Can also consider hematology evaluation to see if there could be any non-GI causes for anemia. 3.  No contraindication to aspirin and Plavix use in my opinion. Can consider prophylactic once a day PPI-benefit is uncertain in this situation. 4.  Unless the clinical situation changes-I do not anticipate any further inpatient GI work-up.     Breanna Smith MD  4/2/2023  8:09 AM

## 2023-04-03 ENCOUNTER — APPOINTMENT (OUTPATIENT)
Facility: HOSPITAL | Age: 68
DRG: 379 | End: 2023-04-03
Payer: MEDICARE

## 2023-04-03 VITALS
SYSTOLIC BLOOD PRESSURE: 159 MMHG | DIASTOLIC BLOOD PRESSURE: 68 MMHG | HEIGHT: 70 IN | HEART RATE: 89 BPM | WEIGHT: 136 LBS | TEMPERATURE: 98.1 F | RESPIRATION RATE: 18 BRPM | OXYGEN SATURATION: 98 % | BODY MASS INDEX: 19.47 KG/M2

## 2023-04-03 LAB
ANION GAP SERPL CALC-SCNC: 8 MMOL/L (ref 3–18)
BASOPHILS # BLD: 0.1 K/UL (ref 0–0.1)
BASOPHILS NFR BLD: 1 % (ref 0–2)
BUN SERPL-MCNC: 17 MG/DL (ref 7–18)
BUN/CREAT SERPL: 14 (ref 12–20)
CALCIUM SERPL-MCNC: 8.6 MG/DL (ref 8.5–10.1)
CHLORIDE SERPL-SCNC: 109 MMOL/L (ref 100–111)
CO2 SERPL-SCNC: 26 MMOL/L (ref 21–32)
CREAT SERPL-MCNC: 1.2 MG/DL (ref 0.6–1.3)
DIFFERENTIAL METHOD BLD: ABNORMAL
ECHO AO ROOT DIAM: 2.8 CM
ECHO AO ROOT INDEX: 1.58 CM/M2
ECHO AV AREA PEAK VELOCITY: 2 CM2
ECHO AV AREA/BSA PEAK VELOCITY: 1.1 CM2/M2
ECHO AV PEAK GRADIENT: 7 MMHG
ECHO AV PEAK VELOCITY: 1.3 M/S
ECHO AV VELOCITY RATIO: 0.77
ECHO BSA: 1.75 M2
ECHO LV E' LATERAL VELOCITY: 8 CM/S
ECHO LV E' SEPTAL VELOCITY: 6 CM/S
ECHO LV FRACTIONAL SHORTENING: 44 % (ref 28–44)
ECHO LV INTERNAL DIMENSION DIASTOLE INDEX: 2.2 CM/M2
ECHO LV INTERNAL DIMENSION DIASTOLIC: 3.9 CM (ref 4.2–5.9)
ECHO LV INTERNAL DIMENSION SYSTOLIC INDEX: 1.24 CM/M2
ECHO LV INTERNAL DIMENSION SYSTOLIC: 2.2 CM
ECHO LV IVSD: 1.2 CM (ref 0.6–1)
ECHO LV MASS 2D: 122.1 G (ref 88–224)
ECHO LV MASS INDEX 2D: 69 G/M2 (ref 49–115)
ECHO LV POSTERIOR WALL DIASTOLIC: 0.8 CM (ref 0.6–1)
ECHO LV RELATIVE WALL THICKNESS RATIO: 0.41
ECHO LVOT AREA: 2.5 CM2
ECHO LVOT DIAM: 1.8 CM
ECHO LVOT PEAK GRADIENT: 4 MMHG
ECHO LVOT PEAK VELOCITY: 1 M/S
ECHO MV A VELOCITY: 0.9 M/S
ECHO MV E DECELERATION TIME (DT): 216 MS
ECHO MV E VELOCITY: 0.88 M/S
ECHO MV E/A RATIO: 0.98
ECHO MV E/E' LATERAL: 11
ECHO MV E/E' RATIO (AVERAGED): 12.83
ECHO MV E/E' SEPTAL: 14.67
ECHO PV MAX VELOCITY: 1.1 M/S
ECHO PV PEAK GRADIENT: 5 MMHG
ECHO RV TAPSE: 1.8 CM (ref 1.7–?)
EOSINOPHIL # BLD: 0.1 K/UL (ref 0–0.4)
EOSINOPHIL NFR BLD: 2 % (ref 0–5)
ERYTHROCYTE [DISTWIDTH] IN BLOOD BY AUTOMATED COUNT: 15.4 % (ref 11.6–14.5)
GLUCOSE SERPL-MCNC: 125 MG/DL (ref 74–99)
HCT VFR BLD AUTO: 23.8 % (ref 36–48)
HCT VFR BLD AUTO: 23.9 % (ref 36–48)
HGB BLD-MCNC: 7.1 G/DL (ref 13–16)
HGB BLD-MCNC: 7.2 G/DL (ref 13–16)
IMM GRANULOCYTES # BLD AUTO: 0 K/UL (ref 0–0.04)
IMM GRANULOCYTES NFR BLD AUTO: 0 % (ref 0–0.5)
LYMPHOCYTES # BLD: 1.8 K/UL (ref 0.9–3.6)
LYMPHOCYTES NFR BLD: 27 % (ref 21–52)
MCH RBC QN AUTO: 27.1 PG (ref 24–34)
MCHC RBC AUTO-ENTMCNC: 29.8 G/DL (ref 31–37)
MCV RBC AUTO: 90.8 FL (ref 78–100)
MONOCYTES # BLD: 0.5 K/UL (ref 0.05–1.2)
MONOCYTES NFR BLD: 8 % (ref 3–10)
NEUTS SEG # BLD: 4.1 K/UL (ref 1.8–8)
NEUTS SEG NFR BLD: 62 % (ref 40–73)
NRBC # BLD: 0 K/UL (ref 0–0.01)
NRBC BLD-RTO: 0 PER 100 WBC
PERIPHERAL SMEAR, MD REVIEW: NORMAL
PLATELET # BLD AUTO: 261 K/UL (ref 135–420)
PMV BLD AUTO: 11.2 FL (ref 9.2–11.8)
POTASSIUM SERPL-SCNC: 4.2 MMOL/L (ref 3.5–5.5)
RBC # BLD AUTO: 2.62 M/UL (ref 4.35–5.65)
SODIUM SERPL-SCNC: 143 MMOL/L (ref 136–145)
WBC # BLD AUTO: 6.6 K/UL (ref 4.6–13.2)

## 2023-04-03 PROCEDURE — 85018 HEMOGLOBIN: CPT

## 2023-04-03 PROCEDURE — 97535 SELF CARE MNGMENT TRAINING: CPT

## 2023-04-03 PROCEDURE — 99239 HOSP IP/OBS DSCHRG MGMT >30: CPT | Performed by: INTERNAL MEDICINE

## 2023-04-03 PROCEDURE — 80048 BASIC METABOLIC PNL TOTAL CA: CPT

## 2023-04-03 PROCEDURE — 36415 COLL VENOUS BLD VENIPUNCTURE: CPT

## 2023-04-03 PROCEDURE — 97116 GAIT TRAINING THERAPY: CPT

## 2023-04-03 PROCEDURE — 6370000000 HC RX 637 (ALT 250 FOR IP): Performed by: PHYSICIAN ASSISTANT

## 2023-04-03 PROCEDURE — 85025 COMPLETE CBC W/AUTO DIFF WBC: CPT

## 2023-04-03 PROCEDURE — 2580000003 HC RX 258: Performed by: PHYSICIAN ASSISTANT

## 2023-04-03 PROCEDURE — 93306 TTE W/DOPPLER COMPLETE: CPT | Performed by: INTERNAL MEDICINE

## 2023-04-03 PROCEDURE — 6370000000 HC RX 637 (ALT 250 FOR IP): Performed by: INTERNAL MEDICINE

## 2023-04-03 PROCEDURE — 93306 TTE W/DOPPLER COMPLETE: CPT

## 2023-04-03 RX ORDER — PANTOPRAZOLE SODIUM 40 MG/1
40 TABLET, DELAYED RELEASE ORAL
Qty: 30 TABLET | Refills: 3 | Status: SHIPPED | DISCHARGE
Start: 2023-04-04

## 2023-04-03 RX ORDER — AMLODIPINE BESYLATE 5 MG/1
5 TABLET ORAL DAILY
Qty: 30 TABLET | Refills: 3 | Status: SHIPPED | DISCHARGE
Start: 2023-04-03

## 2023-04-03 RX ADMIN — ATORVASTATIN CALCIUM 80 MG: 40 TABLET, FILM COATED ORAL at 20:04

## 2023-04-03 RX ADMIN — PANTOPRAZOLE SODIUM 40 MG: 40 TABLET, DELAYED RELEASE ORAL at 08:16

## 2023-04-03 RX ADMIN — SODIUM CHLORIDE, PRESERVATIVE FREE 10 ML: 5 INJECTION INTRAVENOUS at 08:17

## 2023-04-03 NOTE — ACP (ADVANCE CARE PLANNING)
Advance Care Planning     Advance Care Planning Inpatient Note  [facility] Department    Today's Date: 4/3/2023  Unit: [facility]    Received request from . Upon review of chart and communication with care team, patient's decision making abilities are not in question. . Patient was/were present in the room during visit. Goals of ACP Conversation:  Discuss advance care planning documents    Health Care Decision Makers:       Primary Decision Maker: NoeGabriel - Child - [phone]  Summary:  Verified Documents  No Decision Maker named by patient at this time    Norah 53 (Patient Wishes):  None     Assessment:  Patient seen in room 417 today as a new admit to the hospital with GI Bleeding. There is no advance directive present.     Interventions:  Patient DECLINED ACP conversation    Care Preferences Communicated:   No    Outcomes/Plan:      Electronically signed by Adelfo Leahy., [facility] on 4/3/2023 at 1:56 PM

## 2023-04-03 NOTE — CARE COORDINATION
Transition of Care Plan to SNF/Rehab    SNF/Rehab Transition:  Patient ha2s been accepted to [address] [place] [address] and meets criteria for admission. Patient will transported by [facility] Transport and expected to leave at 3 hour window. Communication to Patient/Family:  Met with patient and spoke with son via phone (identified care giver) and they are agreeable to the transition plan. Communication to SNF/Rehab:  Bedside RN, Jaelyn Hickey, has been notified to update the transition plan to the facility and call report (phone number [phone]). Discharge information has been updated on the AVS.     Discharge instructions to be fax'd to facility at - uploaded via [address] and copy sent in folder. Nursing Please include all hard scripts for controlled substances, med rec and dc summary, and AVS in packet. Reviewed and confirmed with facility, [facility] with bookjam, can manage the patient care needs for the following:     Joseph Bhatia with (X) only those applicable:    Medication:  [x]  Medications will be available at the facility  []  IV Antibiotics  []  Controlled Substance - hard copy to be sent with patient   []  Weekly Labs   Documents:  [] Hard RX  [] MAR  [] Kardex  [] AVS  []Transfer Summary  [x]Discharge Summary   Equipment:  []  CPAP/BiPAP  []  Wound Vacuum  []  Marina or Urinary Device  []  PICC/Central Line  []  Nebulizer  []  Ventilator   Treatment:  []Isolation (for MRSA, VRE, etc.)  []Surgical Drain Management  []Tracheostomy Care  []Dressing Changes  []Dialysis with transportation and chair time   []PEG Care  []Oxygen  []Daily Weights for Heart Failure   Dietary:  []Any diet limitations  []Tube Feedings   []Total Parenteral Management (TPN)   Eligible for Medicaid Long Term Services and Supports  Yes:  [] Eligible for medical assistance or will become eligible within 180 days and UAI completed. [] Provider/Patient and/or support system has requested screening.   [] UAI copy provided to

## 2023-04-03 NOTE — NURSE NAVIGATOR
Pt verbalized understanding of discharge paperwork and medications. No c/o pain, no s/s of distress. VSS. Care plans completed. Pt to leave floor via wheelchair accompanied by nursing staff with all belongings. Report called to [address]

## 2023-04-03 NOTE — CARE COORDINATION
CM called Chato Funk with [employer] to verify if patient was SNF vs LTC at [address] Awaiting call back. 1228: Spoke with Chato Funk. She will check the status and follow up CM. She was informed that patient is a possible discharge today.      Francis Carmona, ILAN, RN  Case Management

## 2023-04-03 NOTE — DISCHARGE INSTRUCTIONS
Follow up with hematologist Dr Paniagua Pod [phone]  in 1-2 weeks.     Return to the ED with worsening shortness of breath, if you pass out, if you have fatigue

## 2023-04-03 NOTE — CARE COORDINATION
04/03/23 1203   Service Assessment   Patient Orientation Alert and Oriented   Cognition Alert   History Provided By Patient   Primary Caregiver Other (Comment)  ([address])   Tere Jacobson [address] with the following:;Mobility; Toileting   Can patient return to prior living arrangement Unknown at present   Ability to make needs known: Good   Would you like for me to discuss the discharge plan with any other family members/significant others, and if so, who? Yes  Mary Howard, son)   Financial Resources Medicare   Social/Functional History   Lives With Other (comment)   Type of [address] One level   Home Access Level entry;Ramped entrance   Bathroom Shower/Tub Walk-in shower   Bathroom Toilet Standard   Bathroom Equipment Built-in shower seat;Grab bars in shower   [address] Help From Other (comment); Family  (LTC)   Active  No   Patient's  Info Son transports to appointments   Mode of Transportation Car   Discharge Planning   Type of Residence Long-Term Care   Current Services Prior To Admission Other (Comment)  (LTC)   Potential Assistance Needed Other (Comment)  (LTC)   Patient expects to be discharged to: Long-term care   One/Two Story Residence One story   Services At/After Discharge   Transition of Care Consult (CM Consult) [facility] Discharge Long term care   [address] Provided?  No   Confirm Follow Up Transport   (Medical transport)

## 2023-04-03 NOTE — PLAN OF CARE
Problem: Physical Therapy - Adult  Goal: By Discharge: Performs mobility at highest level of function for planned discharge setting. See evaluation for individualized goals. Description: 1. Patient will move from supine to sit and sit to supine , scoot up and down, and roll side to side in bed with supervision/set-up. 2.  Patient will transfer from bed to chair and chair to bed with supervision/set-up using the least restrictive device. 3.  Patient will perform sit to stand with supervision/set-up. 4.  Patient will ambulate with supervision/set-up for 150 feet with the least restrictive device. 5.  Patient will perform BLE therex as prescribed to tolerance. 6.  Patient will perform stairs as needed for discharge. PLOF: pt came from SNF, ambulatory with rollator, unclear if at SNF for LTC or rehab, son in the area for support     Outcome: Progressing     PHYSICAL THERAPY TREATMENT    Patient: Regan Oconnor (32 y.o. male)  Date: 4/3/2023  Diagnosis: Syncope and collapse [R55]  GI bleeding [K92.2] GI bleeding  Procedure(s) (LRB):  EGD ESOPHAGOGASTRODUODENOSCOPY (N/A) 1 Day Post-Op  Precautions: Fall Risk, Bed Alarm      ASSESSMENT:  Patient received in bed and agreeable to PT treatment. SBA supine to sit transfer. SBA sit to stand transfer to RW. He ambulates 80 ft in hallway with SBA. Slow gait speed, decreased step clearance, and narrow CANDY. No LOB. Declines to sit OOB in recliner. Pt left resting in bed with call bell in reach and no further needs. Progression toward goals:   []      Improving appropriately and progressing toward goals  [x]      Improving slowly and progressing toward goals  []      Not making progress toward goals and plan of care will be adjusted     PLAN:  Patient continues to benefit from skilled intervention to address the above impairments. Continue treatment per established plan of care.     Further Equipment Recommendations for Discharge: [facility]:   Current
assistance he needed. Left pt sitting up in chair with all needs met and call bell within reach. DEFICITS/IMPAIRMENTS:  Performance deficits / Impairments: Decreased functional mobility ; Decreased ADL status; Decreased strength;Decreased endurance;Decreased fine motor control    Patient will benefit from skilled intervention to address the above impairments. Patient's rehabilitation potential/Prognosis: Good. Factors which may influence rehabilitation potential include:   []             None noted  []             Mental ability/status  []             Medical condition  [x]             Home/family situation and support systems  []             Safety awareness  []             Pain tolerance/management  []             Other:      PLAN :  Recommendations and Planned Interventions:   [x]               Self Care Training                  [x]      Therapeutic Activities  [x]               Functional Mobility Training   []      Cognitive Retraining  [x]               Therapeutic Exercises           [x]      Endurance Activities  [x]               Balance Training                    []      Neuromuscular Re-Education  []               Visual/Perceptual Training     [x]      Home Safety Training  [x]               Patient Education                   [x]      Family Training/Education  []               Other (comment):    Frequency/Duration: Patient will be followed by occupational therapy to address goals, 1-2 times per day/3-5 days per week to address goals. Further Equipment Recommendations for Discharge: bedside commode and rollator    AMPAC: Current research shows that an AM-PAC score of 18 or greater is associated with a discharge to the patient's home setting. Based on an AM-PAC score of 18/24 and their current ADL deficits; it is recommended that the patient have 2-3 sessions per week of Occupational Therapy at d/c to increase the patient's independence.       This AMPAC score should be considered in
felt he could get up without assist.  Reiterated need to call when ready to get up and he verbalized agreement. Patient left seated in a recliner with chair alarm engaged at end of session with all needs met. Progression toward goals:  [x]          Improving appropriately and progressing toward goals  []          Improving slowly and progressing toward goals  []          Not making progress toward goals and plan of care will be adjusted     PLAN:  Patient continues to benefit from skilled intervention to address the above impairments. Continue treatment per established plan of care. Further Equipment Recommendations for Discharge: bedside commode; he has a rollator    AMPAC: Current research shows that an AM-PAC score of 18 or greater is associated with a discharge to the patient's home setting. Based on an AM-PAC score of 18/24 and their current ADL deficits; it is recommended that the patient have 2-3 sessions per week of Occupational Therapy at d/c to increase the patient's independence. This AMPAC score should be considered in conjunction with interdisciplinary team recommendations to determine the most appropriate discharge setting. Patient's social support, diagnosis, medical stability, and prior level of function should also be taken into consideration. SUBJECTIVE:   Patient stated, \"I need to use the bathroom while I'm here. \"    OBJECTIVE DATA SUMMARY:   Cognitive/Behavioral Status:  Orientation  Overall Orientation Status: Within Normal Limits  Orientation Level: Oriented X4    Functional Mobility and Transfers for ADLs:   Bed Mobility:  Bed Mobility Training  Rolling: Supervision  Supine to Sit: Supervision   Transfers:  Transfer Training  Sit to Stand: Stand-by assistance  Stand to Sit: Stand-by assistance    Balance:  Balance  Sitting: Intact  Standing: With support  Standing - Static: Good  Standing - Dynamic: Fair ((+))    ADL Intervention:  Feeding: Modified independent   Grooming:
greater is associated with a discharge to the patient's home setting. Based on an AM-PAC score of 18/24 (or **/20 if omitting stairs) and their current functional mobility deficits, it is recommended that the patient have 2-3 sessions per week of Physical Therapy at d/c to increase the patient's independence.

## 2023-04-03 NOTE — CARE COORDINATION
CM met with patient at bedside. Spoke with patient's son via phone. Per patient and son, the patient is LTC at [address] [place] [address]. Clinicals uploaded via [facility] and booking requesting sent to [address] [place] [address].      Zaira Alvarado, ILAN, RN  Case Management

## 2023-04-03 NOTE — DISCHARGE SUMMARY
[facility]  Discharge Summary       Patient: Karyn Arnold Age: 77 y.o. : 1955 MR#: [id number] SSN: [id number]  PCP on record: Jorge L John MD  Admit date: 2023  Discharge date: 4/3/2023    Consults:  -[facility]  -     Significant Diagnostic Studies: -CT Result (most recent):  CT HEAD WO CONTRAST 2023    Narrative  EXAM: CT of the Head without contrast    INDICATION:  fall . Dizziness after fall. History of stroke in the past.    TECHNIQUE: CT of the head from the vertex to the skull base performed. No IV  contrast administered. All CT scans at this facility are performed using dose optimization technique as  appropriate to a performed exam, to include automated exposure control,  adjustment of the mA and/or kV according to patient size (including appropriate  matching for site specific examination) or use of iterative reconstruction  technique. COMPARISON: None. FINDINGS: No evidence of acute intra-axial or extra-axial hemorrhage. The  ventricles and sulci are symmetric but moderately prominent. Multifocal white  matter hypodensities are noted. Several lacunae are noted in the internal  capsule on the left and left basal ganglia. A large area of encephalomalacia in  the left cerebellum is noted No midline shift, mass effect or mass lesion  appreciated. The gray-white junction is preserved. No evidence of an acute  infarct identified. The mastoid air cells are well aerated. There is  opacification of the left maxillary sinus with osseous thickening. This may  represent chronic sinusitis. The orbits are normal. The scalp and skull are  unremarkable. Impression  1. No acute intracranial process identified. 2.  Moderate portable volume loss and chronic small vessel ischemic changes. 3.  Multifocal lacunae and large area of encephalomalacia in the left  cerebellum.       - Xray Result (most

## 2023-04-03 NOTE — CARE COORDINATION
Requested Case Management specialist to assist with transportation to [facility] [address] [facility], [place], [place] and phone number is [phone]. Patient will require BLS transport. Pt requires Stretcher If stretcher, reason: CVA, hx of CVA, lower extremity weakness, impaired mobility, and syncope and collapse. Patient is currently requiring oxygen no. Height: 5'10     Weight: 136 lb  Pt is on isolation: no  Is the pt ready now? yes   Requested time: next available  PCS Faxed: no  Insurance verified on face sheet: yes  Auth needed for transport: yes  CM completed PCS/ Envelope and placed on chart.      Magaly Neal, ILAN, RN  Case Management

## 2023-04-03 NOTE — CARE COORDINATION
Discharge order noted for today. Patient has been accepted to [place] [address]. Confirmed with Hitesh Veloz, that bed is available today. Met with patient and spoke with son via phone and are agreeable to the transition plan today. Insurance authorization has been obtained. Transport to facility has been arranged through [facility] with 3 hour window timeframe for transport. Transportation will call nurse's station with transport time. Patient's discharge summary has been forwarded to skilled nursing facility via [address]. Bedside RN, Michael Prado, has been updated to the transition plan. Discharge information has been updated on the AVS.  Please call report to [phone].         VICK Hinkle, RN  Case Management

## 2023-04-04 NOTE — PROGRESS NOTES
conducted an initial consultation and Spiritual Assessment for Sandra Rae, who is a 77 y.o.,male. Patients Primary Language is: [place]. According to the patients EMR Yarsanism Affiliation is: [facility].     The reason the Patient came to the hospital is:   Patient Active Problem List    Diagnosis Date Noted    GI bleeding 04/01/2023    Syncope and collapse 04/01/2023    Anemia 04/01/2023    Facial droop 06/23/2022    Fever 06/23/2022    CVA (cerebral vascular accident) ([facility] [address] 06/22/2022    Syncope 05/19/2022    Essential hypertension, benign 04/25/2022    GI bleed 02/03/2022    Sigmoiditis 02/03/2022    Acute CVA (cerebrovascular accident) ([facility] [address] 01/02/2022    History of CVA (cerebrovascular accident) 01/02/2022    Lower extremity weakness 01/02/2022        The  provided the following Interventions:  Initiated a relationship of care and support with patient in room 417 this morning. Found patient covered up to his chi with a blanket as he stated he was cold. There is no advance directive. Patient  is a new admit here with a GI Bleed. Listened empathically. Provided chaplaincy education. Provided information about Spiritual Care Services. Offered prayer  on patients behalf. The following outcomes were achieved:  Patient shared limited information about his medical narrative and spiritual journey/beliefs. Patient processed feeling about current hospitalization. Patient expressed gratitude for pastoral care visit. Assessment:  Patient does not have any Synagogue/cultural needs that will affect patients preferences in health care. There are no further spiritual or Synagogue issues which require Spiritual Care Services interventions at this time. Plan:  Chaplains will continue to follow and will provide pastoral care on an as needed/requested basis    . [facility]   Spiritual Care   [phone]
1850-Received patient from ED, oriented to    room,   tele-box applied. 1930-Bedside and Verbal shift change report given to Steff (oncoming nurse) by Tessy Anthony (offgoing nurse). Report included the following information Nurse Handoff Report.
1923 Bedside and Verbal shift change  Received from [facility] (outgoing nurse), to ALEXANDRA Hart (incoming)  Pt. Is AOX 3, Pt. denies pain at this time. Report included the following information SBAR, Procedure Summary, Intake/Output, MAR, Recent Results, Med Rec Status. Will Resume care and monitor Pt. Condition. 1930  Pt. Head to Toe Assessment Done and documented. Pt. Educated on call bell when in need of help and assistance. Pt. verbalized understanding. Bed alarm on.    2005  Pt son Came to pick Pt. To transport to [facility].     Pt left per wheelchair
[phone]    Gastroenterology follow up-Progress note    Impression:  Acute blood loss anemia with melena-suspected upper GI bleed; history of aspirin/Plavix use. Pantoprazole as outpatient. Baseline hemoglobin in June 2022 12.0. Currently admitted with a hemoglobin of 7.7, 7.1 today. - EGD 4/2: small hiatal hernia, otherwise normal, no source for anemia/blood loss noted    Diabetes. Stroke-on aspirin/Plavix; Endoscopic history:   Admitted in 2/2022 to [facility] for GIB. EGD was unremarkable. Out patient Colonoscopy in 6/2022 also unremarkable. Plan:  EGD and colo history showed no bleeding sources or sources for anemia. Recommend outpatient evaluation with pill camera. Consider hematology eval for non-GI cause of anemia    No GI contraindication for aspirin/Plavix    May use prophylactic daily PPI but benefit is uncertain in this situation    Chief Complaint: Anemia/melena    Subjective:  Pt seen sitting up in bed, states he feels good, and has not noted any signs of bleeding. ROS: Denies any fevers, chills, rash.      Eyes: conjunctiva normal, EOM normal   Neck: ROM normal, supple and trachea normal   Cardiovascular: heart normal, intact distal pulses, normal rate and regular rhythm   Pulmonary/Chest Wall: breath sounds normal and effort normal   Abdominal: appearance normal, bowel sounds normal and soft, non-acute, non-tender     Patient Active Problem List   Diagnosis    Acute CVA (cerebrovascular accident) ([address]    GI bleed    History of CVA (cerebrovascular accident)    Lower extremity weakness    Sigmoiditis    Syncope    CVA (cerebral vascular accident) ([address]    Facial droop    Fever    GI bleeding    Syncope and collapse    Anemia    Essential hypertension, benign         /71   Pulse 69   Temp 98.1 °F (36.7 °C) (Oral)   Resp 18   Ht 5' 10\" (1.778 m)   Wt 136 lb (61.7 kg)   SpO2 99%   BMI 19.51 kg/m²         Intake/Output Summary (Last 24 hours) at 4/3/2023 1046  Last data
Called [facility] transportation at [phone] scheduled stretcher transport to [address] [place] [address] ([address] [place], [place]) with [facility] and received confirmation number [id number]. Transportation can span up to 3 hours,  will call nurse's station when in route to the facility. Informed Virgilio Carte of transportation conversation and arrangements.
Physical Therapy    Pt not seen for skilled PT due to:    []  Nausea/vomiting  []  Eating  []  Pain  []  Pt lethargic  [x]  Off Unit (ENDO 7:53)  Other:     Will f/u later as schedule allows. Thank you.   Amrita Dutton, PT, DPT
[phone]    [email or web address]  Options provided:  -- Syncope due to GIB  -- Syncope due to other hypotension  -- Syncope due to other, (please specify).   -- Other - I will add my own diagnosis  -- Disagree - Not applicable / Not valid  -- Disagree - Clinically unable to determine / Unknown  -- Refer to Clinical Documentation Reviewer    PROVIDER RESPONSE TEXT:    The syncope is due to unclear cause    Query created by: Daniel Chambers on 4/3/2023 11:23 AM      Electronically signed by:  Dino Beyer MD 4/3/2023 12:44 PM
WBC 11.0 4.6 - 13.2 K/uL    RBC 2.86 (L) 4.35 - 5.65 M/uL    Hemoglobin 7.7 (L) 13.0 - 16.0 g/dL    Hematocrit 25.4 (L) 36.0 - 48.0 %    MCV 88.8 78.0 - 100.0 FL    MCH 26.9 24.0 - 34.0 PG    MCHC 30.3 (L) 31.0 - 37.0 g/dL    RDW 15.2 (H) 11.6 - 14.5 %    Platelets 881 498 - 403 K/uL    MPV 11.6 9.2 - 11.8 FL    Nucleated RBCs 0.0 0  WBC    nRBC 0.00 0.00 - 0.01 K/uL    Seg Neutrophils 80 (H) 40 - 73 %    Lymphocytes 13 (L) 21 - 52 %    Monocytes 5 3 - 10 %    Eosinophils % 0 0 - 5 %    Basophils 1 0 - 2 %    Immature Granulocytes 0 0.0 - 0.5 %    Segs Absolute 8.9 (H) 1.8 - 8.0 K/UL    Absolute Lymph # 1.5 0.9 - 3.6 K/UL    Absolute Mono # 0.6 0.05 - 1.2 K/UL    Absolute Eos # 0.0 0.0 - 0.4 K/UL    Basophils Absolute 0.1 0.0 - 0.1 K/UL    Absolute Immature Granulocyte 0.0 0.00 - 0.04 K/UL    Differential Type AUTOMATED     CBC with Auto Differential    Collection Time: 04/02/23 12:00 AM   Result Value Ref Range    WBC 8.7 4.6 - 13.2 K/uL    RBC 2.55 (L) 4.35 - 5.65 M/uL    Hemoglobin 6.9 (L) 13.0 - 16.0 g/dL    Hematocrit 23.0 (L) 36.0 - 48.0 %    MCV 90.2 78.0 - 100.0 FL    MCH 27.1 24.0 - 34.0 PG    MCHC 30.0 (L) 31.0 - 37.0 g/dL    RDW 15.5 (H) 11.6 - 14.5 %    Platelets 256 038 - 093 K/uL    MPV 11.6 9.2 - 11.8 FL    Nucleated RBCs 0.0 0  WBC    nRBC 0.00 0.00 - 0.01 K/uL    Seg Neutrophils 68 40 - 73 %    Lymphocytes 22 21 - 52 %    Monocytes 9 3 - 10 %    Eosinophils % 1 0 - 5 %    Basophils 1 0 - 2 %    Immature Granulocytes 0 0.0 - 0.5 %    Segs Absolute 5.9 1.8 - 8.0 K/UL    Absolute Lymph # 1.9 0.9 - 3.6 K/UL    Absolute Mono # 0.8 0.05 - 1.2 K/UL    Absolute Eos # 0.1 0.0 - 0.4 K/UL    Basophils Absolute 0.0 0.0 - 0.1 K/UL    Absolute Immature Granulocyte 0.0 0.00 - 0.04 K/UL    Differential Type AUTOMATED     Basic Metabolic Panel    Collection Time: 04/02/23 12:00 AM   Result Value Ref Range    Sodium 142 136 - 145 mmol/L    Potassium 3.8 3.5 - 5.5 mmol/L    Chloride 110 100 - 111 mmol/L

## 2023-04-28 ENCOUNTER — APPOINTMENT (OUTPATIENT)
Facility: HOSPITAL | Age: 68
End: 2023-04-28
Payer: MEDICARE

## 2023-04-28 ENCOUNTER — HOSPITAL ENCOUNTER (OUTPATIENT)
Facility: HOSPITAL | Age: 68
Setting detail: INFUSION SERIES
End: 2023-04-28
Payer: MEDICARE

## 2023-04-28 ENCOUNTER — HOSPITAL ENCOUNTER (EMERGENCY)
Facility: HOSPITAL | Age: 68
Discharge: HOME | End: 2023-04-29
Attending: EMERGENCY MEDICINE
Payer: MEDICARE

## 2023-04-28 VITALS
DIASTOLIC BLOOD PRESSURE: 73 MMHG | RESPIRATION RATE: 18 BRPM | SYSTOLIC BLOOD PRESSURE: 144 MMHG | TEMPERATURE: 98.2 F | OXYGEN SATURATION: 98 % | HEART RATE: 81 BPM

## 2023-04-28 DIAGNOSIS — R07.9 CHEST PAIN, UNSPECIFIED TYPE: Primary | ICD-10-CM

## 2023-04-28 LAB
ABO + RH BLD: NORMAL
ALBUMIN SERPL-MCNC: 3.6 G/DL (ref 3.4–5)
ALBUMIN/GLOB SERPL: 1 (ref 0.8–1.7)
ALP SERPL-CCNC: 91 U/L (ref 45–117)
ALT SERPL-CCNC: 19 U/L (ref 16–61)
ANION GAP SERPL CALC-SCNC: 6 MMOL/L (ref 3–18)
AST SERPL-CCNC: 16 U/L (ref 10–38)
BASOPHILS # BLD: 0.1 K/UL (ref 0–0.1)
BASOPHILS NFR BLD: 1 % (ref 0–2)
BILIRUB SERPL-MCNC: 0.3 MG/DL (ref 0.2–1)
BLD PROD TYP BPU: NORMAL
BLD PROD TYP BPU: NORMAL
BLOOD BANK DISPENSE STATUS: NORMAL
BLOOD BANK DISPENSE STATUS: NORMAL
BLOOD GROUP ANTIBODIES SERPL: NORMAL
BPU ID: NORMAL
BPU ID: NORMAL
BUN SERPL-MCNC: 11 MG/DL (ref 7–18)
BUN/CREAT SERPL: 9 (ref 12–20)
CALCIUM SERPL-MCNC: 9.3 MG/DL (ref 8.5–10.1)
CHLORIDE SERPL-SCNC: 108 MMOL/L (ref 100–111)
CO2 SERPL-SCNC: 28 MMOL/L (ref 21–32)
CREAT SERPL-MCNC: 1.29 MG/DL (ref 0.6–1.3)
CROSSMATCH RESULT: NORMAL
CROSSMATCH RESULT: NORMAL
DIFFERENTIAL METHOD BLD: ABNORMAL
EOSINOPHIL # BLD: 0.1 K/UL (ref 0–0.4)
EOSINOPHIL NFR BLD: 1 % (ref 0–5)
ERYTHROCYTE [DISTWIDTH] IN BLOOD BY AUTOMATED COUNT: 15.6 % (ref 11.6–14.5)
GLOBULIN SER CALC-MCNC: 3.7 G/DL (ref 2–4)
GLUCOSE SERPL-MCNC: 168 MG/DL (ref 74–99)
HCT VFR BLD AUTO: 26 % (ref 36–48)
HGB BLD-MCNC: 7.4 G/DL (ref 13–16)
HISTORY CHECK: NORMAL
IMM GRANULOCYTES # BLD AUTO: 0 K/UL (ref 0–0.04)
IMM GRANULOCYTES NFR BLD AUTO: 0 % (ref 0–0.5)
LYMPHOCYTES # BLD: 1.9 K/UL (ref 0.9–3.6)
LYMPHOCYTES NFR BLD: 20 % (ref 21–52)
MCH RBC QN AUTO: 23.9 PG (ref 24–34)
MCHC RBC AUTO-ENTMCNC: 28.5 G/DL (ref 31–37)
MCV RBC AUTO: 84.1 FL (ref 78–100)
MONOCYTES # BLD: 0.6 K/UL (ref 0.05–1.2)
MONOCYTES NFR BLD: 6 % (ref 3–10)
NEUTS SEG # BLD: 6.9 K/UL (ref 1.8–8)
NEUTS SEG NFR BLD: 72 % (ref 40–73)
NRBC # BLD: 0 K/UL (ref 0–0.01)
NRBC BLD-RTO: 0 PER 100 WBC
PLATELET # BLD AUTO: 276 K/UL (ref 135–420)
PLATELET COMMENT: ABNORMAL
PMV BLD AUTO: 11.4 FL (ref 9.2–11.8)
POTASSIUM SERPL-SCNC: 3.7 MMOL/L (ref 3.5–5.5)
PROT SERPL-MCNC: 7.3 G/DL (ref 6.4–8.2)
RBC # BLD AUTO: 3.09 M/UL (ref 4.35–5.65)
RBC MORPH BLD: ABNORMAL
SODIUM SERPL-SCNC: 142 MMOL/L (ref 136–145)
SPECIMEN EXP DATE BLD: NORMAL
TROPONIN I SERPL HS-MCNC: 48 NG/L (ref 0–78)
UNIT DIVISION: 0
UNIT DIVISION: 0
WBC # BLD AUTO: 9.6 K/UL (ref 4.6–13.2)

## 2023-04-28 PROCEDURE — 84484 ASSAY OF TROPONIN QUANT: CPT

## 2023-04-28 PROCEDURE — 86850 RBC ANTIBODY SCREEN: CPT

## 2023-04-28 PROCEDURE — 86923 COMPATIBILITY TEST ELECTRIC: CPT

## 2023-04-28 PROCEDURE — 71045 X-RAY EXAM CHEST 1 VIEW: CPT

## 2023-04-28 PROCEDURE — 86900 BLOOD TYPING SEROLOGIC ABO: CPT

## 2023-04-28 PROCEDURE — 36415 COLL VENOUS BLD VENIPUNCTURE: CPT

## 2023-04-28 PROCEDURE — 80053 COMPREHEN METABOLIC PANEL: CPT

## 2023-04-28 PROCEDURE — 85025 COMPLETE CBC W/AUTO DIFF WBC: CPT

## 2023-04-28 PROCEDURE — 93005 ELECTROCARDIOGRAM TRACING: CPT | Performed by: EMERGENCY MEDICINE

## 2023-04-28 PROCEDURE — 86901 BLOOD TYPING SEROLOGIC RH(D): CPT

## 2023-04-28 PROCEDURE — 99285 EMERGENCY DEPT VISIT HI MDM: CPT

## 2023-04-28 ASSESSMENT — PAIN DESCRIPTION - ORIENTATION: ORIENTATION: LEFT;RIGHT

## 2023-04-28 ASSESSMENT — PAIN DESCRIPTION - FREQUENCY: FREQUENCY: INTERMITTENT

## 2023-04-28 ASSESSMENT — PAIN - FUNCTIONAL ASSESSMENT
PAIN_FUNCTIONAL_ASSESSMENT: 0-10
PAIN_FUNCTIONAL_ASSESSMENT: PREVENTS OR INTERFERES SOME ACTIVE ACTIVITIES AND ADLS

## 2023-04-28 ASSESSMENT — PAIN DESCRIPTION - PAIN TYPE: TYPE: CHRONIC PAIN

## 2023-04-28 ASSESSMENT — PAIN SCALES - GENERAL
PAINLEVEL_OUTOF10: 8
PAINLEVEL_OUTOF10: 0

## 2023-04-28 ASSESSMENT — LIFESTYLE VARIABLES
HOW MANY STANDARD DRINKS CONTAINING ALCOHOL DO YOU HAVE ON A TYPICAL DAY: PATIENT DOES NOT DRINK
HOW OFTEN DO YOU HAVE A DRINK CONTAINING ALCOHOL: NEVER

## 2023-04-28 ASSESSMENT — PAIN DESCRIPTION - LOCATION: LOCATION: HIP

## 2023-04-28 NOTE — PROGRESS NOTES
Patient arrived at 12 to [facility] via wheelchair, accompanied by family member. Patient states bilateral hip pain 8/10. Did not take pain medication today. Instructed to take pain medication on return home. Positioned for comfort. Patient assessed and education completed. Discussed risks and benefits, route, duration, observation and management during blood transfusion. He verbalized understanding. Signed copy on chart. Patient Vitals for the past 24 hrs:   BP Temp Temp src Pulse Resp SpO2   04/28/23 1037 (!) 144/73 98.2 °F (36.8 °C) Oral 81 18 98 %     Blood sample obtained via kimmy-puncture stick from left AC x 1 attempt for type and crossmatch. Blood sample sent out for processing( 3 pink and 1 lavender ) Gauze and coban to site of kimmy-puncture. Blood armband labeled and applied to patient's arm. He was instructed to keep same on and come with it on Monday for blood transfusion. He was instructed not to get armband wet. Patient departed [facility] at 12 in stable condition. He is to return on 5/1/23 at 0800 am for blood transfusion.

## 2023-04-29 VITALS
OXYGEN SATURATION: 99 % | RESPIRATION RATE: 22 BRPM | SYSTOLIC BLOOD PRESSURE: 132 MMHG | WEIGHT: 146 LBS | TEMPERATURE: 97.8 F | BODY MASS INDEX: 20.44 KG/M2 | HEIGHT: 71 IN | HEART RATE: 65 BPM | DIASTOLIC BLOOD PRESSURE: 49 MMHG

## 2023-04-29 LAB
EKG ATRIAL RATE: 75 BPM
EKG DIAGNOSIS: NORMAL
EKG P AXIS: 77 DEGREES
EKG P-R INTERVAL: 168 MS
EKG Q-T INTERVAL: 414 MS
EKG QRS DURATION: 76 MS
EKG QTC CALCULATION (BAZETT): 462 MS
EKG R AXIS: 50 DEGREES
EKG T AXIS: 63 DEGREES
EKG VENTRICULAR RATE: 75 BPM
TROPONIN I SERPL HS-MCNC: 70 NG/L (ref 0–78)

## 2023-04-29 PROCEDURE — 93010 ELECTROCARDIOGRAM REPORT: CPT | Performed by: INTERNAL MEDICINE

## 2023-04-29 ASSESSMENT — ENCOUNTER SYMPTOMS
CONSTIPATION: 0
WHEEZING: 0
DIARRHEA: 0
COUGH: 0
BACK PAIN: 0
RHINORRHEA: 0
ABDOMINAL PAIN: 0
EYE REDNESS: 0
SORE THROAT: 0
SHORTNESS OF BREATH: 0
EYE DISCHARGE: 0
STRIDOR: 0
NAUSEA: 0
VOMITING: 0

## 2023-04-29 NOTE — ED PROVIDER NOTES
EMERGENCY DEPARTMENT HISTORY AND PHYSICAL EXAM    Date: 4/28/2023  Patient Name: Jose Manuel Nevarez    History of Presenting Illness     Chief Complaint   Patient presents with    Chest Pain         History Provided By: patient     Chief Complaint: chest pain   Duration: 7:30 PM tonight   Timing:  acute   Location: L side of the chest   Quality: pressure  Severity: moderate  Modifying Factors: none  Associated Symptoms: chest pain       Additional History (Context): Jose Manuel Nevarez is a 77 y.o. male with PMH stroke, hyperlipidemia, DM, and htn who presents with c/o a brief episode of L sided chest pain starting around 7:30 PM and lasting 5 minutes. Denies SOB cough fever chills and leg swelling. No other complaints reported at this time. PCP: Coy Lennox, MD    No current facility-administered medications for this encounter.      Current Outpatient Medications   Medication Sig Dispense Refill    pantoprazole (PROTONIX) 40 MG tablet Take 1 tablet by mouth every morning (before breakfast) 30 tablet 3    amLODIPine (NORVASC) 5 MG tablet Take 1 tablet by mouth daily 30 tablet 3    aspirin 81 MG chewable tablet Take 81 mg by mouth daily      atorvastatin (LIPITOR) 80 MG tablet Take 80 mg by mouth      Cholecalciferol 50 MCG (2000 UT) TABS Take by mouth (Patient not taking: Reported on 4/1/2023)         Past History     Past Medical History:  Past Medical History:   Diagnosis Date    Diabetes ([facility] [address]     Gastrointestinal disorder     Hyperlipidemia     Hypertension     Stroke [facility])        Past Surgical History:  Past Surgical History:   Procedure Laterality Date    COLONOSCOPY N/A 6/6/2022    DIAGNOSTIC COLONOSCOPY performed by Austen Ch MD at [place] [address]  2/7/2022         UPPER GASTROINTESTINAL ENDOSCOPY N/A 4/2/2023    EGD ESOPHAGOGASTRODUODENOSCOPY performed by Keysha Rose MD at [facility] ENDOSCOPY       Family History:  Family History   Problem Relation Age of Onset

## 2023-05-01 ENCOUNTER — HOSPITAL ENCOUNTER (OUTPATIENT)
Facility: HOSPITAL | Age: 68
Setting detail: INFUSION SERIES
End: 2023-05-01
Payer: MEDICARE

## 2023-05-01 VITALS
TEMPERATURE: 97.3 F | DIASTOLIC BLOOD PRESSURE: 68 MMHG | HEART RATE: 59 BPM | SYSTOLIC BLOOD PRESSURE: 136 MMHG | OXYGEN SATURATION: 98 % | RESPIRATION RATE: 18 BRPM

## 2023-05-01 PROCEDURE — 2580000003 HC RX 258: Performed by: NURSE PRACTITIONER

## 2023-05-01 PROCEDURE — P9016 RBC LEUKOCYTES REDUCED: HCPCS

## 2023-05-01 PROCEDURE — 6370000000 HC RX 637 (ALT 250 FOR IP): Performed by: NURSE PRACTITIONER

## 2023-05-01 PROCEDURE — 36430 TRANSFUSION BLD/BLD COMPNT: CPT

## 2023-05-01 RX ORDER — SODIUM CHLORIDE 9 MG/ML
INJECTION, SOLUTION INTRAVENOUS PRN
Status: DISCONTINUED | OUTPATIENT
Start: 2023-05-01 | End: 2023-10-28 | Stop reason: HOSPADM

## 2023-05-01 RX ORDER — DIPHENHYDRAMINE HCL 25 MG
25 CAPSULE ORAL ONCE
Status: COMPLETED | OUTPATIENT
Start: 2023-05-01 | End: 2023-05-01

## 2023-05-01 RX ORDER — SODIUM CHLORIDE 0.9 % (FLUSH) 0.9 %
5-40 SYRINGE (ML) INJECTION PRN
Status: DISCONTINUED | OUTPATIENT
Start: 2023-05-01 | End: 2023-10-28 | Stop reason: HOSPADM

## 2023-05-01 RX ORDER — ACETAMINOPHEN 325 MG/1
650 TABLET ORAL ONCE
Status: COMPLETED | OUTPATIENT
Start: 2023-05-01 | End: 2023-05-01

## 2023-05-01 RX ORDER — SODIUM CHLORIDE 9 MG/ML
INJECTION, SOLUTION INTRAVENOUS CONTINUOUS
Status: DISCONTINUED | OUTPATIENT
Start: 2023-05-01 | End: 2023-10-28 | Stop reason: HOSPADM

## 2023-05-01 RX ADMIN — SODIUM CHLORIDE: 9 INJECTION, SOLUTION INTRAVENOUS at 08:33

## 2023-05-01 RX ADMIN — Medication 10 ML: at 12:00

## 2023-05-01 RX ADMIN — DIPHENHYDRAMINE HYDROCHLORIDE 25 MG: 25 CAPSULE ORAL at 08:32

## 2023-05-01 RX ADMIN — Medication 10 ML: at 15:13

## 2023-05-01 RX ADMIN — ACETAMINOPHEN 650 MG: 325 TABLET, FILM COATED ORAL at 08:32

## 2023-05-01 RX ADMIN — Medication 10 ML: at 08:26

## 2023-05-01 ASSESSMENT — PAIN SCALES - GENERAL: PAINLEVEL_OUTOF10: 6

## 2023-05-01 ASSESSMENT — PAIN DESCRIPTION - ORIENTATION: ORIENTATION: RIGHT

## 2023-05-01 ASSESSMENT — PAIN DESCRIPTION - LOCATION: LOCATION: HIP

## 2023-05-01 NOTE — PROGRESS NOTES
[facility] Progress Note    Date: May 1, 2023    Name: Luann So    MRN: [id number]         : 1955    2 Units of PRBCs    Mr. Jason Martinez arrived to [facility] at 0800, via wheelchair accompanied by son. Mr. Jason Martinez was assessed and education was provided. Discussed risks and benefits of blood transfusion with patient, including risk of transfusion reaction and disease transmission. Patient verbalized understanding and signed consent placed on chart. Mr. José Miguel Del Castillo vitals were reviewed. Vitals:    23 0807   BP: 126/63   Pulse: 66   Resp: 18   Temp: 97.5 °F (36.4 °C)   SpO2: 99%       20g IV inserted in patient's L FA  x1 attempt. Positive for blood return and flushes without difficulty. Normal saline initiated at [facility]. Pre-medications (Tylenol 650 mg PO and Benadryl 25 mg PO) were administered as ordered. First unit of two ordered units of PRBCs initiated @ 75 ml/hr at 0906. Fifteen minutes into infusion, VS stable and pt denied c/o SOB, itching/hives, lip/tongue/facial swelling, CP or other complaints. Infusion rate increased to 155 ml/hr for the remainder of the transfusion. Unit finished @ 25 [id number]. VS stable and no transfusion reaction suspected. Second unit of two ordered units of PRBCs initiated @ 75 ml/hr at 1202. Fifteen minutes into infusion, VS stable and pt denied c/o SOB, itching/hives, lip/tongue/facial swelling, CP or other complaints. Infusion rate increased to 155 ml/hr for the remainder of the transfusion. Unit finished @ [id number]. VS stable and no transfusion reaction suspected. Mr. Jason Martinez tolerated infusion without complaints. IV removed. No irritation, bleeding, or hematoma noted at site. Gauze and coban applied to site. Patient remained in [facility] for 60 minutes for observation. No signs of allergic reaction noted.     Patient Vitals for the past 12 hrs:   Temp Pulse Resp BP SpO2   23 1513 97.3 °F (36.3 °C) 59 18 136/68 98 %   23 1404 97.3 °F (36.3 °C) 63

## 2023-05-02 LAB
ABO + RH BLD: NORMAL
BLD PROD TYP BPU: NORMAL
BLD PROD TYP BPU: NORMAL
BLOOD BANK DISPENSE STATUS: NORMAL
BLOOD BANK DISPENSE STATUS: NORMAL
BLOOD GROUP ANTIBODIES SERPL: NORMAL
BPU ID: NORMAL
BPU ID: NORMAL
CROSSMATCH RESULT: NORMAL
CROSSMATCH RESULT: NORMAL
SPECIMEN EXP DATE BLD: NORMAL
UNIT DIVISION: 0
UNIT DIVISION: 0

## 2023-07-25 ENCOUNTER — HOSPITAL ENCOUNTER (OUTPATIENT)
Facility: HOSPITAL | Age: 68
Discharge: HOME | End: 2023-07-28
Payer: MEDICARE

## 2023-07-25 ENCOUNTER — HOSPITAL ENCOUNTER (OUTPATIENT)
Facility: HOSPITAL | Age: 68
Discharge: HOME | End: 2023-07-27
Payer: MEDICARE

## 2023-07-25 DIAGNOSIS — I63.033 CEREBRAL INFARCTION DUE TO BILATERAL THROMBOSIS OF CAROTID ARTERIES (HCC): ICD-10-CM

## 2023-07-25 PROCEDURE — 70551 MRI BRAIN STEM W/O DYE: CPT

## 2023-07-25 PROCEDURE — 93225 XTRNL ECG REC<48 HRS REC: CPT

## (undated) DEVICE — BITE BLOCK ENDOSCP UNIV AD 6 TO 9.4 MM

## (undated) DEVICE — SYRINGE MED 25GA 3ML L5/8IN SUBQ PLAS W/ DETACH NDL SFTY

## (undated) DEVICE — CANNULA NSL AD TBNG L14FT STD PVC O2 CRV CONN NONFLARED NSL

## (undated) DEVICE — SYRINGE MED 50ML LUERSLIP TIP

## (undated) DEVICE — LINER SUCT CANSTR 3000CC PLAS SFT PRE ASSEMB W/OUT TBNG W/

## (undated) DEVICE — GAUZE,SPONGE,4"X4",16PLY,STRL,LF,10/TRAY: Brand: MEDLINE

## (undated) DEVICE — CATHETER SUCT TR FL TIP 14FR W/ O CTRL

## (undated) DEVICE — MEDI-VAC NON-CONDUCTIVE SUCTION TUBING: Brand: CARDINAL HEALTH

## (undated) DEVICE — SOLUTION IRRIG 1000ML H2O STRL BLT

## (undated) DEVICE — STERILE POLYISOPRENE POWDER-FREE SURGICAL GLOVES: Brand: PROTEXIS

## (undated) DEVICE — BASIN EMSIS 16OZ GRAPHITE PLAS KID SHP MOLD GRAD FOR ORAL

## (undated) DEVICE — YANKAUER,SMOOTH HANDLE,HIGH CAPACITY: Brand: MEDLINE INDUSTRIES, INC.

## (undated) DEVICE — UNDERPAD INCONT W23XL36IN STD BLU POLYPR BK FLUF SFT

## (undated) DEVICE — ENDOSCOPY PUMP TUBING/ CAP SET: Brand: ERBE